# Patient Record
Sex: MALE | Race: WHITE
[De-identification: names, ages, dates, MRNs, and addresses within clinical notes are randomized per-mention and may not be internally consistent; named-entity substitution may affect disease eponyms.]

---

## 2018-05-24 ENCOUNTER — HOSPITAL ENCOUNTER (EMERGENCY)
Dept: HOSPITAL 42 - ED | Age: 53
Discharge: HOME | End: 2018-05-24
Payer: MEDICAID

## 2018-05-24 VITALS — TEMPERATURE: 98.2 F | OXYGEN SATURATION: 98 % | HEART RATE: 85 BPM

## 2018-05-24 VITALS — BODY MASS INDEX: 25.7 KG/M2

## 2018-05-24 VITALS — SYSTOLIC BLOOD PRESSURE: 146 MMHG | DIASTOLIC BLOOD PRESSURE: 90 MMHG

## 2018-05-24 VITALS — RESPIRATION RATE: 18 BRPM

## 2018-05-24 DIAGNOSIS — M54.9: ICD-10-CM

## 2018-05-24 DIAGNOSIS — R53.1: ICD-10-CM

## 2018-05-24 DIAGNOSIS — I10: Primary | ICD-10-CM

## 2018-05-24 DIAGNOSIS — G89.29: ICD-10-CM

## 2018-05-24 LAB
ALBUMIN SERPL-MCNC: 4.8 G/DL (ref 3–4.8)
ALBUMIN/GLOB SERPL: 1.8 {RATIO} (ref 1.1–1.8)
ALT SERPL-CCNC: 65 U/L (ref 7–56)
APTT BLD: 23.3 SECONDS (ref 25.1–36.5)
AST SERPL-CCNC: 29 U/L (ref 17–59)
BASOPHILS # BLD AUTO: 0.02 K/MM3 (ref 0–2)
BASOPHILS NFR BLD: 0.3 % (ref 0–3)
BNP SERPL-MCNC: < 11.1 PG/ML (ref 0–450)
BUN SERPL-MCNC: 16 MG/DL (ref 7–21)
CALCIUM SERPL-MCNC: 9.7 MG/DL (ref 8.4–10.5)
EOSINOPHIL # BLD: 0.1 10*3/UL (ref 0–0.7)
EOSINOPHIL NFR BLD: 1.3 % (ref 1.5–5)
ERYTHROCYTE [DISTWIDTH] IN BLOOD BY AUTOMATED COUNT: 14.7 % (ref 11.5–14.5)
GFR NON-AFRICAN AMERICAN: > 60
GRANULOCYTES # BLD: 3.88 10*3/UL (ref 1.4–6.5)
GRANULOCYTES NFR BLD: 51.6 % (ref 50–68)
HGB BLD-MCNC: 13.9 G/DL (ref 14–18)
INR PPP: 0.87 (ref 0.93–1.08)
LYMPHOCYTES # BLD: 3.2 10*3/UL (ref 1.2–3.4)
LYMPHOCYTES NFR BLD AUTO: 41.8 % (ref 22–35)
MCH RBC QN AUTO: 27.8 PG (ref 25–35)
MCHC RBC AUTO-ENTMCNC: 33.8 G/DL (ref 31–37)
MCV RBC AUTO: 82.2 FL (ref 80–105)
MONOCYTES # BLD AUTO: 0.4 10*3/UL (ref 0.1–0.6)
MONOCYTES NFR BLD: 5 % (ref 1–6)
PLATELET # BLD: 251 10^3/UL (ref 120–450)
PMV BLD AUTO: 10.1 FL (ref 7–11)
PROTHROMBIN TIME: 10 SECONDS (ref 9.4–12.5)
RBC # BLD AUTO: 5 10^6/UL (ref 3.5–6.1)
TROPONIN I SERPL-MCNC: < 0.01 NG/ML
WBC # BLD AUTO: 7.5 10^3/UL (ref 4.5–11)

## 2018-05-24 PROCEDURE — 80053 COMPREHEN METABOLIC PANEL: CPT

## 2018-05-24 PROCEDURE — 85025 COMPLETE CBC W/AUTO DIFF WBC: CPT

## 2018-05-24 PROCEDURE — 82948 REAGENT STRIP/BLOOD GLUCOSE: CPT

## 2018-05-24 PROCEDURE — 99285 EMERGENCY DEPT VISIT HI MDM: CPT

## 2018-05-24 PROCEDURE — 83615 LACTATE (LD) (LDH) ENZYME: CPT

## 2018-05-24 PROCEDURE — 96375 TX/PRO/DX INJ NEW DRUG ADDON: CPT

## 2018-05-24 PROCEDURE — 84484 ASSAY OF TROPONIN QUANT: CPT

## 2018-05-24 PROCEDURE — 83735 ASSAY OF MAGNESIUM: CPT

## 2018-05-24 PROCEDURE — 83880 ASSAY OF NATRIURETIC PEPTIDE: CPT

## 2018-05-24 PROCEDURE — 93005 ELECTROCARDIOGRAM TRACING: CPT

## 2018-05-24 PROCEDURE — 96374 THER/PROPH/DIAG INJ IV PUSH: CPT

## 2018-05-24 PROCEDURE — 85730 THROMBOPLASTIN TIME PARTIAL: CPT

## 2018-05-24 PROCEDURE — 82550 ASSAY OF CK (CPK): CPT

## 2018-05-24 PROCEDURE — 85610 PROTHROMBIN TIME: CPT

## 2018-05-24 NOTE — CARD
--------------- APPROVED REPORT --------------





EKG Measurement

Heart [id number]

[id number]

[id number]

[id number]

[id number]



<Conclusion>

Normal sinus rhythm

Normal ECG

## 2018-05-24 NOTE — ED PDOC
Arrival/HPI





- General


Chief Complaint: Weakness/Neurological Deficit


Time Seen by Provider: 05/24/18 09:50


Historian: Patient





- History of Present Illness


Narrative History of Present Illness (Text): 





05/24/18 10:22


53yo male with pmhx of chronic back pain referred to ED by his PMD for 

generalized weakness and elevated blood pressure. Patient states his BP was 

elevated 3days ago when she went to see his PMD, but he is not on 

antihypertensive medication. States he went back to see his PMD this morning, 

because he felt weak and his BP was still elevated this morning. He denies 

focal weakness, headache, slurred speech, chest pain, dizziness, visual changes

, nausea, vomiting, upper back ripping/tearing pain, any other complaint.





Past Medical History





- Provider Review


Nursing Documentation Reviewed: Yes





- Infectious Disease


Hx of Infectious Diseases: None





- Tetanus Immunization


Tetanus Immunization: Unknown





- Psychiatric


Hx Substance Use: No





Family/Social History





- Physician Review


Nursing Documentation Reviewed: Yes


Family/Social History: Unknown Family HX


Smoking Status: Never Smoked


Hx Alcohol Use: No


Hx Substance Use: No





Allergies/Home Meds


Allergies/Adverse Reactions: 


Allergies





No Known Allergies Allergy (Verified 05/24/18 09:53)


 








Home Medications: 


 Home Meds











 Medication  Instructions  Recorded  Confirmed


 


Acetaminophen/Oxycodone Hydr 1 tab PO PRN PRN 06/23/16 05/24/18





[Percocet 10/325 mg Tab]   


 


Hydrocodone/Acetaminophen [Vicodin 1 tab PO PRN PRN 06/23/16 05/24/18





5 mg-300 mg]   


 


oxyCODONE [oxyCODONE Immediate 15 mg PO PRN PRN 06/23/16 05/24/18





Release Tab]   














Review of Systems





- Physician Review


All systems were reviewed & negative as marked: Yes





- Review of Systems


Constitutional: Fatigue, Other (Elevated BP)


Eyes: Normal


ENT: Normal


Respiratory: Normal


Cardiovascular: Normal


Gastrointestinal: Normal


Genitourinary Male: Normal


Musculoskeletal: Normal


Skin: Normal


Neurological: Normal


Endocrine: Normal


Hemo/Lymphatic: Normal


Psychiatric: Normal





Physical Exam


Vital Signs Reviewed: Yes


Vital Signs











  Pulse Resp BP Pulse Ox


 


 05/24/18 11:16  84  18  146/90  97


 


 05/24/18 10:17  78  18  142/91 H  99


 


 05/24/18 10:11  79   192/113 H 


 


 05/24/18 09:54  91 H  21  162/102 H  99











Temperature: Afebrile


Blood Pressure: Hypertensive


Pulse: Regular


Respiratory Rate: Normal


Appearance: Positive for: Well-Appearing, Non-Toxic, Comfortable


Pain Distress: None


Mental Status: Positive for: Alert and Oriented X 3





- Systems Exam


Head: Present: Atraumatic, Normocephalic


Pupils: Present: PERRL


Extroacular Muscles: Present: EOMI


Conjunctiva: Present: Normal


Mouth: Present: Moist Mucous Membranes


Neck: Present: Normal Range of Motion


Respiratory/Chest: Present: Clear to Auscultation, Good Air Exchange.  No: 

Respiratory Distress, Accessory Muscle Use


Cardiovascular: Present: Regular Rate and Rhythm, Normal S1, S2.  No: Murmurs


Abdomen: No: Tenderness, Distention, Peritoneal Signs


Back: Present: Normal Inspection


Upper Extremity: Present: Normal Inspection.  No: Cyanosis, Edema


Lower Extremity: Present: Normal Inspection.  No: Edema


Neurological: Present: GCS=15, CN II-XII Intact, Speech Normal, Motor Func 

Grossly Intact, Normal Sensory Function, Normal Cerebellar Funct, Norm Deep 

Tendon Reflexes, Gait Normal, Memory Normal, Normal 2Pt Descrimination, Other (

No focal neurological deficit)


Skin: Present: Warm, Dry, Normal Color.  No: Rashes


Psychiatric: Present: Alert, Oriented x 3, Normal Insight, Normal Concentration





Medical Decision Making


ED Course and Treatment: 





05/24/18 11:30


53yo male bib EMS for generalized weakness and elevated BP





Labs was ordered reviewed and unremarkable





EKG NSR @86bpm





Pt have no focal neurological deficit. He was neurologically intact in ED. His 

BP improved in ED with medication. He complained of back pain, which is his 

typical pain and toradol was given for pain control. He was DC hme with Norvasc 

5mg. Advised to check his BP regularly and f/u with his PMD.





- Lab Interpretations


Lab Results: 








 05/24/18 10:11 





 05/24/18 10:11 





 Lab Results





05/24/18 10:11: Sodium 145, Potassium 4.0, Chloride 105, Carbon Dioxide 23, 

Anion Gap 21 H, BUN 16, Creatinine 0.7 L, Est GFR ( Amer) > 60, Est GFR (

Non-Af Amer) > 60, Random Glucose 105, Calcium 9.7, Magnesium 2.0, Total 

Bilirubin 0.3, AST 29, ALT 65 H, Alkaline Phosphatase 61, Lactate Dehydrogenase 

444, Total Creatine Kinase 83, Troponin I < 0.01, NT-Pro-B Natriuret Pep < 11.1

, Total Protein 7.5, Albumin 4.8, Globulin 2.7, Albumin/Globulin Ratio 1.8


05/24/18 10:11: PT 10.0, INR 0.87 L, APTT 23.3 L


05/24/18 10:11: WBC 7.5, RBC 5.00, Hgb 13.9 L, Hct 41.1 L, MCV 82.2, MCH 27.8, 

MCHC 33.8, RDW 14.7 H, Plt Count 251, MPV 10.1, Gran % 51.6, Lymph % (Auto) 

41.8 H, Mono % (Auto) 5.0, Eos % (Auto) 1.3 L, Baso % (Auto) 0.3, Gran # 3.88, 

Lymph # (Auto) 3.2, Mono # (Auto) 0.4, Eos # (Auto) 0.1, Baso # (Auto) 0.02


05/24/18 09:54: POC Glucose (mg/dL) 101











- Medication Orders


Current Medication Orders: 











Discontinued Medications





Ketorolac Tromethamine (Toradol)  30 mg IVP STAT STA


   Stop: 05/24/18 11:20


Metoprolol Tartrate (Lopressor)  5 mg IVP STAT STA


   Stop: 05/24/18 09:58


   Last Admin: 05/24/18 10:11  Dose: 5 mg





IVP Administration


 Document     05/24/18 10:11  CAST  (Rec: 05/24/18 10:12  CAST  [id number])


     Charges for Administration


      # of IVP Administrations                   1


MAR Pulse and Blood Pressure


 Document     05/24/18 10:11  [email or web address]  (Rec: 05/24/18 10:12  [email or web address]  [id number])


     Pulse


      Pulse Rate (60-90 beats/min)               79


     Blood Pressure


      Blood Pressure (100//90 mm Hg)       192/113














Disposition/Present on Arrival





- Present on Arrival


Any Indicators Present on Arrival: No


History of DVT/PE: No


History of Uncontrolled Diabetes: No


Urinary Catheter: No


History of Decub. Ulcer: No


History Surgical Site Infection Following: None





- Disposition


Have Diagnosis and Disposition been Completed?: Yes


Diagnosis: 


 Hypertension, Weakness, Chronic back pain





Disposition: HOME/ ROUTINE


Disposition Time: 11:15


Patient Plan: Discharge


Patient Problems: 


 Current Active Problems











Problem Status Onset


 


Hypertension Acute  


 


Weakness Acute  











Condition: STABLE


Discharge Instructions (ExitCare):  High Blood Pressure in Adults, Controlling 

Your Blood Pressure Through Lifestyle, Weakness (ED)


Additional Instructions: 


Follow up with your Doctor


Return to ED for any new or worsening symptoms


Prescriptions: 


amLODIPine [Norvasc] 5 mg PO DAILY #15 tab


Referrals: 


Chichi Jones MD [Primary Care Provider] - Follow up with primary


Forms:  [employer] (English)